# Patient Record
Sex: FEMALE | Race: WHITE | NOT HISPANIC OR LATINO | URBAN - METROPOLITAN AREA
[De-identification: names, ages, dates, MRNs, and addresses within clinical notes are randomized per-mention and may not be internally consistent; named-entity substitution may affect disease eponyms.]

---

## 2024-01-08 ENCOUNTER — HOSPITAL ENCOUNTER (EMERGENCY)
Facility: OTHER | Age: 24
Discharge: HOME | End: 2024-01-08
Attending: EMERGENCY MEDICINE
Payer: COMMERCIAL

## 2024-01-08 VITALS
TEMPERATURE: 98 F | HEART RATE: 59 BPM | BODY MASS INDEX: 25.61 KG/M2 | WEIGHT: 150 LBS | SYSTOLIC BLOOD PRESSURE: 104 MMHG | OXYGEN SATURATION: 99 % | DIASTOLIC BLOOD PRESSURE: 72 MMHG | HEIGHT: 64 IN | RESPIRATION RATE: 17 BRPM

## 2024-01-08 DIAGNOSIS — R07.89 CHEST WALL PAIN: ICD-10-CM

## 2024-01-08 DIAGNOSIS — R09.1 PLEURISY: Primary | ICD-10-CM

## 2024-01-08 DIAGNOSIS — R07.9 RIGHT-SIDED CHEST PAIN: ICD-10-CM

## 2024-01-08 LAB
ALBUMIN SERPL BCP-MCNC: 3.5 G/DL (ref 3.5–5.2)
ALP SERPL-CCNC: 50 U/L (ref 55–135)
ALT SERPL W/O P-5'-P-CCNC: 20 U/L (ref 10–44)
ANION GAP SERPL CALC-SCNC: 10 MMOL/L (ref 8–16)
AST SERPL-CCNC: 19 U/L (ref 10–40)
B-HCG UR QL: NEGATIVE
BACTERIA #/AREA URNS HPF: NORMAL /HPF
BASOPHILS # BLD AUTO: 0.02 K/UL (ref 0–0.2)
BASOPHILS NFR BLD: 0.3 % (ref 0–1.9)
BILIRUB SERPL-MCNC: 0.2 MG/DL (ref 0.1–1)
BILIRUB UR QL STRIP: NEGATIVE
BUN SERPL-MCNC: 18 MG/DL (ref 6–20)
CALCIUM SERPL-MCNC: 8.7 MG/DL (ref 8.7–10.5)
CHLORIDE SERPL-SCNC: 108 MMOL/L (ref 95–110)
CLARITY UR: CLEAR
CO2 SERPL-SCNC: 20 MMOL/L (ref 23–29)
COLOR UR: YELLOW
CREAT SERPL-MCNC: 0.7 MG/DL (ref 0.5–1.4)
CTP QC/QA: YES
CTP QC/QA: YES
DIFFERENTIAL METHOD BLD: NORMAL
EOSINOPHIL # BLD AUTO: 0.2 K/UL (ref 0–0.5)
EOSINOPHIL NFR BLD: 2.1 % (ref 0–8)
ERYTHROCYTE [DISTWIDTH] IN BLOOD BY AUTOMATED COUNT: 11.8 % (ref 11.5–14.5)
EST. GFR  (NO RACE VARIABLE): >60 ML/MIN/1.73 M^2
GLUCOSE SERPL-MCNC: 88 MG/DL (ref 70–110)
GLUCOSE UR QL STRIP: NEGATIVE
HCT VFR BLD AUTO: 38.6 % (ref 37–48.5)
HGB BLD-MCNC: 12.9 G/DL (ref 12–16)
HGB UR QL STRIP: ABNORMAL
IMM GRANULOCYTES # BLD AUTO: 0.02 K/UL (ref 0–0.04)
IMM GRANULOCYTES NFR BLD AUTO: 0.3 % (ref 0–0.5)
KETONES UR QL STRIP: ABNORMAL
LACTATE SERPL-SCNC: 0.6 MMOL/L (ref 0.5–2.2)
LEUKOCYTE ESTERASE UR QL STRIP: NEGATIVE
LIPASE SERPL-CCNC: 54 U/L (ref 4–60)
LYMPHOCYTES # BLD AUTO: 2.6 K/UL (ref 1–4.8)
LYMPHOCYTES NFR BLD: 32.3 % (ref 18–48)
MCH RBC QN AUTO: 29.9 PG (ref 27–31)
MCHC RBC AUTO-ENTMCNC: 33.4 G/DL (ref 32–36)
MCV RBC AUTO: 90 FL (ref 82–98)
MICROSCOPIC COMMENT: NORMAL
MONOCYTES # BLD AUTO: 0.4 K/UL (ref 0.3–1)
MONOCYTES NFR BLD: 4.4 % (ref 4–15)
NEUTROPHILS # BLD AUTO: 4.8 K/UL (ref 1.8–7.7)
NEUTROPHILS NFR BLD: 60.6 % (ref 38–73)
NITRITE UR QL STRIP: NEGATIVE
NRBC BLD-RTO: 0 /100 WBC
PH UR STRIP: 6 [PH] (ref 5–8)
PLATELET # BLD AUTO: 231 K/UL (ref 150–450)
PMV BLD AUTO: 11.5 FL (ref 9.2–12.9)
POC MOLECULAR INFLUENZA A AGN: NEGATIVE
POC MOLECULAR INFLUENZA B AGN: NEGATIVE
POTASSIUM SERPL-SCNC: 4 MMOL/L (ref 3.5–5.1)
PROT SERPL-MCNC: 6.7 G/DL (ref 6–8.4)
PROT UR QL STRIP: ABNORMAL
RBC # BLD AUTO: 4.31 M/UL (ref 4–5.4)
RBC #/AREA URNS HPF: 1 /HPF (ref 0–4)
SODIUM SERPL-SCNC: 138 MMOL/L (ref 136–145)
SP GR UR STRIP: >1.03 (ref 1–1.03)
SQUAMOUS #/AREA URNS HPF: 3 /HPF
TROPONIN I SERPL DL<=0.01 NG/ML-MCNC: 0.01 NG/ML (ref 0–0.03)
URN SPEC COLLECT METH UR: ABNORMAL
UROBILINOGEN UR STRIP-ACNC: NEGATIVE EU/DL
WBC # BLD AUTO: 7.93 K/UL (ref 3.9–12.7)
WBC #/AREA URNS HPF: 1 /HPF (ref 0–5)

## 2024-01-08 PROCEDURE — 25000003 PHARM REV CODE 250: Performed by: NURSE PRACTITIONER

## 2024-01-08 PROCEDURE — 96360 HYDRATION IV INFUSION INIT: CPT

## 2024-01-08 PROCEDURE — 83690 ASSAY OF LIPASE: CPT | Performed by: EMERGENCY MEDICINE

## 2024-01-08 PROCEDURE — 93005 ELECTROCARDIOGRAM TRACING: CPT

## 2024-01-08 PROCEDURE — 81000 URINALYSIS NONAUTO W/SCOPE: CPT | Performed by: EMERGENCY MEDICINE

## 2024-01-08 PROCEDURE — 99285 EMERGENCY DEPT VISIT HI MDM: CPT | Mod: 25

## 2024-01-08 PROCEDURE — 93010 ELECTROCARDIOGRAM REPORT: CPT | Mod: ,,, | Performed by: INTERNAL MEDICINE

## 2024-01-08 PROCEDURE — 84484 ASSAY OF TROPONIN QUANT: CPT | Performed by: EMERGENCY MEDICINE

## 2024-01-08 PROCEDURE — 80053 COMPREHEN METABOLIC PANEL: CPT | Performed by: EMERGENCY MEDICINE

## 2024-01-08 PROCEDURE — 85025 COMPLETE CBC W/AUTO DIFF WBC: CPT | Performed by: EMERGENCY MEDICINE

## 2024-01-08 PROCEDURE — 25000003 PHARM REV CODE 250: Performed by: EMERGENCY MEDICINE

## 2024-01-08 PROCEDURE — 81025 URINE PREGNANCY TEST: CPT | Performed by: NURSE PRACTITIONER

## 2024-01-08 PROCEDURE — 83605 ASSAY OF LACTIC ACID: CPT | Performed by: EMERGENCY MEDICINE

## 2024-01-08 RX ORDER — NAPROXEN 500 MG/1
500 TABLET ORAL 2 TIMES DAILY WITH MEALS
Qty: 60 TABLET | Refills: 0 | Status: SHIPPED | OUTPATIENT
Start: 2024-01-08

## 2024-01-08 RX ORDER — ACETAMINOPHEN 325 MG/1
650 TABLET ORAL
Status: COMPLETED | OUTPATIENT
Start: 2024-01-08 | End: 2024-01-08

## 2024-01-08 RX ADMIN — ACETAMINOPHEN 650 MG: 325 TABLET, FILM COATED ORAL at 06:01

## 2024-01-08 RX ADMIN — SODIUM CHLORIDE 1000 ML: 9 INJECTION, SOLUTION INTRAVENOUS at 06:01

## 2024-01-08 NOTE — ED PROVIDER NOTES
"SCRIBE #1 NOTE: I, Patient, am scribing for, and in the presence of, .         Source of History:  Patient    Chief complaint:  Rib Injury (C/o right sided rib pain x 3 hrs , worsens with movement and deep inspiration)      HPI:  Hoa Estevez is a 23 y.o. female  with right upper abdominal pain over the past 5.5 hours. Patient has had cold s/s for the pas six days and saw UC four days ago, being prescribed a Z-desi for this. She took 200mg Advil when her pain began to no relief. She denies any fever, chills, nausea, vomiting, diarrhea, weight changes, or vaginal discharge. Patient flew in from [facility] 1.5 weeks ago to visit for the holidays. She alexandra any SHx of alcohol, tobacco, or illicit drug use.  She denies nausea vomiting or pain with eating.  She denies alcohol use.  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 Denies STI, denies weight loss    This is the extent to the patients complaints today here in the emergency department.    ROS:   See HPI.    Review of patient's allergies indicates:  No Known Allergies    PMH:  As per HPI and below:  History reviewed. No pertinent past medical history.  Past Surgical History:   Procedure Laterality Date    WISDOM TOOTH EXTRACTION              Physical Exam:    BP (!) 99/59 (BP Location: Right arm, Patient Position: Sitting)   Pulse (!) 59   Temp 97.8 °F (36.6 °C) (Oral)   Resp 16   Ht 5' 4" (1.626 m)   Wt 68 kg (150 lb)   SpO2 100%   BMI 25.75 kg/m²   Nursing note and vital signs reviewed.  Constitutional: No acute distress.  Nontoxic  Cardiovascular: Regular rate and rhythm.  No murmurs. No gallops. No rubs, mild TTP over right posterior rib cage  Respiratory: Clear to auscultation bilaterally.  Good air movement.  No wheezes.  No rhonchi. No rales. No accessory muscle use..  Abdomen:   Right upper quadrant tenderness with positive Petit's sign.  No guarding.  No rebound. Non-peritoneal.  Musculoskeletal: Good range of motion all joints.  No deformities.  No lower " extremity edema.  Neck supple.  No meningismus.  Extremities: No pitting edema  Neuro: alert. At baseline.    Summary of Previous Medical Records:  No previous medical records to review.    MDM/ Differential Dx:    Initial impression: 23 year old female with RUQ pain. Differentials include pleurisy, pneumonia, pleural effusion, also given exam consider cholelithiasis, cholecystitis, cystitis, [id number] syndrome. Will get a CBC CMP lipase, as well as troponin given patient's complaints to evaluate for myocarditis pericarditis, cholelithiasis, hepatitis pancreatitis    I have considered the diagnosis of pulmonary embolism however patient is low risk, and PERC negative.  Will not further pursue pulmonary embolism    PERC CRITERIA  Initial Impression: 0  Age ?50   No 0 Yes+1    HR ?100   No 0 Yes+1   SaO2 on room air <95%   No 0 Yes+1   Unilateral leg swelling   No 0 Yes+1   Hemoptysis   No 0 Yes+1   Recent surgery or trauma   Surgery or trauma ?4 weeks ago requiring treatment with general anesthesia   No0 Yes+1   Prior PE or DVT   No 0 Yes+1   Hormone use   Oral contraceptives, hormone replacement or estrogenic hormones use in males or female patients   No 0 Yes+1     Patient is perk negative.  X-ray an EKG without any evidence of pneumonia and no evidence of pericarditis or myocarditis no evidence of hepatitis or cholelithiasis.  Suspect patient's symptoms are related to pleurisy and have instructed her to take anti-inflammatories 500 mg once in the morning once in the evening as well as instructions to return if symptoms are getting worse or changing.      ED Course as of 01/08/24 1916 Mon Jan 08, 2024 1816 Occult Blood UA(!): 3+ [MG]   1816 Ketones, UA(!): Trace [MG]   1823 Specific Gravity, UA(!): >1.030 [MG]   1823 Protein, UA(!): Trace [MG]   1823 Ketones, UA(!): Trace [MG]   1823 Occult Blood UA(!): 3+ [MG]   1911 Patient's exam and history match up with radiographic findings to suggest she has  pleurisy.  No evidence of biliary are liver problems.  Will encourage patient to take anti-inflammatories scheduled for the next week, and return precautions [MG]      ED Course User Index  [MG] Marsha Tierney MD                 Diagnostic Impression:    1. Pleurisy    2. Right-sided chest pain    3. Chest wall pain         ED Disposition Condition    Discharge Stable            ED Prescriptions       Medication Sig Dispense Start Date End Date Auth. Provider    naproxen (NAPROSYN) 500 MG tablet Take 1 tablet (500 mg total) by mouth 2 (two) times daily with meals. 60 tablet 1/8/2024 -- Marsha Tierney MD          Follow-up Information       Follow up With Specialties Details Why Contact Info    [facility] Emergency Dept Emergency Medicine   [address] [address] [phone]               Marsha Tierney MD  01/08/24 7642

## 2024-01-08 NOTE — ED TRIAGE NOTES
Pt arrived with c/o R sided lower rib pain x 4 hours.  Pt denies any recent falls or injury.  PT denies any SOB.  Pain described as dull and aching at rest, sharp when coughing.  Pt states she has had cold symptoms including productive cough since 1/2/23, states she is taking a z-desi.  Pt answering questions appropriately, speaking in complete sentences, respirations even and unlabored.  Aao x 4.

## 2024-01-09 NOTE — DISCHARGE INSTRUCTIONS
Your workup in the emergency department was reassuring.  You have no signs of infection or problems with your liver or gallbladder.  Your symptoms are related to pleurisy which is inflammation of the lung the lining.  Please take anti-inflammatories, 500 mg in the morning and 500 in the evening.  This should be getting better over the next couple of days, but you will likely need to take this for the next 7-10 days.    Return to the ER for any other concerns